# Patient Record
Sex: MALE | Race: WHITE | Employment: FULL TIME | URBAN - METROPOLITAN AREA
[De-identification: names, ages, dates, MRNs, and addresses within clinical notes are randomized per-mention and may not be internally consistent; named-entity substitution may affect disease eponyms.]

---

## 2017-09-10 ENCOUNTER — APPOINTMENT (OUTPATIENT)
Dept: GENERAL RADIOLOGY | Age: 37
End: 2017-09-10
Payer: COMMERCIAL

## 2017-09-10 ENCOUNTER — HOSPITAL ENCOUNTER (EMERGENCY)
Age: 37
Discharge: HOME | End: 2017-09-10
Payer: COMMERCIAL

## 2017-09-10 VITALS
DIASTOLIC BLOOD PRESSURE: 76 MMHG | SYSTOLIC BLOOD PRESSURE: 120 MMHG | HEART RATE: 76 BPM | HEIGHT: 73 IN | RESPIRATION RATE: 16 BRPM | TEMPERATURE: 97.9 F | BODY MASS INDEX: 24.65 KG/M2 | WEIGHT: 186 LBS | OXYGEN SATURATION: 97 %

## 2017-09-10 DIAGNOSIS — S46.911A SHOULDER STRAIN, RIGHT, INITIAL ENCOUNTER: Primary | ICD-10-CM

## 2017-09-10 PROCEDURE — 99283 EMERGENCY DEPT VISIT LOW MDM: CPT

## 2017-09-10 PROCEDURE — 73030 X-RAY EXAM OF SHOULDER: CPT

## 2017-09-10 ASSESSMENT — ENCOUNTER SYMPTOMS
ABDOMINAL PAIN: 0
SHORTNESS OF BREATH: 0
BACK PAIN: 0
COUGH: 0

## 2017-09-10 ASSESSMENT — PAIN DESCRIPTION - PAIN TYPE: TYPE: ACUTE PAIN;CHRONIC PAIN

## 2017-09-10 ASSESSMENT — PAIN DESCRIPTION - DESCRIPTORS: DESCRIPTORS: BURNING;PRESSURE

## 2017-09-10 ASSESSMENT — PAIN DESCRIPTION - ORIENTATION: ORIENTATION: RIGHT

## 2017-09-10 ASSESSMENT — PAIN DESCRIPTION - LOCATION: LOCATION: SHOULDER

## 2017-09-10 ASSESSMENT — PAIN DESCRIPTION - FREQUENCY: FREQUENCY: CONTINUOUS

## 2017-09-10 ASSESSMENT — PAIN SCALES - GENERAL: PAINLEVEL_OUTOF10: 7

## 2017-09-10 ASSESSMENT — PAIN DESCRIPTION - ONSET: ONSET: ON-GOING

## 2018-07-16 ENCOUNTER — HOSPITAL ENCOUNTER (EMERGENCY)
Age: 38
Discharge: HOME | End: 2018-07-16

## 2018-07-16 VITALS
BODY MASS INDEX: 29.16 KG/M2 | RESPIRATION RATE: 16 BRPM | TEMPERATURE: 97.8 F | HEIGHT: 73 IN | HEART RATE: 56 BPM | SYSTOLIC BLOOD PRESSURE: 122 MMHG | OXYGEN SATURATION: 97 % | WEIGHT: 220 LBS | DIASTOLIC BLOOD PRESSURE: 86 MMHG

## 2018-07-16 DIAGNOSIS — Z76.0 ENCOUNTER FOR MEDICATION REFILL: Primary | ICD-10-CM

## 2018-07-16 PROCEDURE — 99281 EMR DPT VST MAYX REQ PHY/QHP: CPT

## 2018-07-16 RX ORDER — PHENYTOIN SODIUM 100 MG/1
100 CAPSULE, EXTENDED RELEASE ORAL 4 TIMES DAILY
Qty: 120 CAPSULE | Refills: 0 | Status: ON HOLD | OUTPATIENT
Start: 2018-07-16 | End: 2019-05-09 | Stop reason: HOSPADM

## 2018-07-16 RX ORDER — PHENYTOIN SODIUM 100 MG/1
CAPSULE, EXTENDED RELEASE ORAL
Status: ON HOLD | COMMUNITY
End: 2019-05-09 | Stop reason: HOSPADM

## 2018-07-16 ASSESSMENT — ENCOUNTER SYMPTOMS
DIARRHEA: 0
ABDOMINAL PAIN: 0
NAUSEA: 0
COUGH: 0
VOMITING: 0
SHORTNESS OF BREATH: 0

## 2018-07-16 NOTE — ED PROVIDER NOTES
education: N/A     Social History Main Topics    Smoking status: Current Every Day Smoker     Packs/day: 0.50     Types: Cigarettes    Smokeless tobacco: Never Used    Alcohol use No    Drug use: No    Sexual activity: Yes     Partners: Female     Other Topics Concern    None     Social History Narrative    None       SCREENINGS             PHYSICAL EXAM    (up to 7 for level 4, 8 or more for level 5)     ED Triage Vitals [07/16/18 1744]   BP Temp Temp Source Pulse Resp SpO2 Height Weight   122/86 97.8 °F (36.6 °C) Oral 56 16 97 % 6' 1\" (1.854 m) 220 lb (99.8 kg)       Physical Exam   Constitutional: He is oriented to person, place, and time. He appears well-developed and well-nourished. He is active. No distress. HENT:   Head: Normocephalic and atraumatic. Right Ear: Hearing, tympanic membrane, external ear and ear canal normal.   Left Ear: Hearing, tympanic membrane, external ear and ear canal normal.   Mouth/Throat: Uvula is midline, oropharynx is clear and moist and mucous membranes are normal.   Eyes: Conjunctivae, EOM and lids are normal. Pupils are equal, round, and reactive to light. Neck: Trachea normal and normal range of motion. Neck supple. Cardiovascular: Normal rate, regular rhythm, normal heart sounds, intact distal pulses and normal pulses. Pulmonary/Chest: Effort normal and breath sounds normal.   Abdominal: Soft. Normal appearance and bowel sounds are normal. There is no tenderness. Neurological: He is alert and oriented to person, place, and time. He has normal strength. No focal neurological deficits   Skin: Skin is warm, dry and intact. No rash noted. He is not diaphoretic. Psychiatric: He has a normal mood and affect. His behavior is normal. Judgment and thought content normal.   Nursing note and vitals reviewed. All other labs were within normal range or not returned as of this dictation.     EMERGENCY DEPARTMENT COURSE and DIFFERENTIAL DIAGNOSIS/MDM:   Vitals:

## 2018-07-16 NOTE — ED TRIAGE NOTES
Pt ran out of dilantin a  Month ago, here for a med refill. Pt said he had a seizure a week ago.   Pt awake alert oriented x 3 color pink skin wam and dry

## 2019-05-08 ENCOUNTER — APPOINTMENT (OUTPATIENT)
Dept: CT IMAGING | Age: 39
DRG: 310 | End: 2019-05-08

## 2019-05-08 ENCOUNTER — HOSPITAL ENCOUNTER (INPATIENT)
Age: 39
LOS: 1 days | Discharge: HOME | DRG: 310 | End: 2019-05-09
Attending: INTERNAL MEDICINE | Admitting: INTERNAL MEDICINE

## 2019-05-08 DIAGNOSIS — R00.1 BRADYCARDIA: Primary | ICD-10-CM

## 2019-05-08 DIAGNOSIS — I95.1 ORTHOSTATIC HYPOTENSION: ICD-10-CM

## 2019-05-08 DIAGNOSIS — R42 DIZZINESS: ICD-10-CM

## 2019-05-08 LAB
ALBUMIN SERPL-MCNC: 4.9 G/DL (ref 3.5–4.6)
ALP BLD-CCNC: 52 U/L (ref 35–104)
ALT SERPL-CCNC: 34 U/L (ref 0–41)
AMPHETAMINE SCREEN, URINE: ABNORMAL
ANION GAP SERPL CALCULATED.3IONS-SCNC: 13 MEQ/L (ref 9–15)
AST SERPL-CCNC: 36 U/L (ref 0–40)
BARBITURATE SCREEN URINE: ABNORMAL
BASOPHILS ABSOLUTE: 0 K/UL (ref 0–0.2)
BASOPHILS RELATIVE PERCENT: 0.6 %
BENZODIAZEPINE SCREEN, URINE: ABNORMAL
BILIRUB SERPL-MCNC: 0.6 MG/DL (ref 0.2–0.7)
BILIRUBIN URINE: NEGATIVE
BLOOD, URINE: NEGATIVE
BUN BLDV-MCNC: 17 MG/DL (ref 6–20)
CALCIUM SERPL-MCNC: 9.5 MG/DL (ref 8.5–9.9)
CANNABINOID SCREEN URINE: POSITIVE
CARBOXYHEMOGLOBIN: 5.2 % (ref 0–4)
CHLORIDE BLD-SCNC: 99 MEQ/L (ref 95–107)
CK MB: 10.1 NG/ML (ref 0–6.7)
CLARITY: CLEAR
CO2: 27 MEQ/L (ref 20–31)
COCAINE METABOLITE SCREEN URINE: ABNORMAL
COLOR: YELLOW
CREAT SERPL-MCNC: 0.87 MG/DL (ref 0.7–1.2)
CREATINE KINASE-MB INDEX: 1.7 % (ref 0–3.5)
EOSINOPHILS ABSOLUTE: 0.1 K/UL (ref 0–0.7)
EOSINOPHILS RELATIVE PERCENT: 1.7 %
ETHANOL PERCENT: NORMAL G/DL
ETHANOL: <10 MG/DL (ref 0–0.08)
GFR AFRICAN AMERICAN: >60
GFR NON-AFRICAN AMERICAN: >60
GLOBULIN: 2.9 G/DL (ref 2.3–3.5)
GLUCOSE BLD-MCNC: 143 MG/DL (ref 70–99)
GLUCOSE URINE: NEGATIVE MG/DL
HBA1C MFR BLD: 5.6 % (ref 4.8–5.9)
HCT VFR BLD CALC: 42 % (ref 42–52)
HEMOGLOBIN: 14.5 G/DL (ref 14–18)
KETONES, URINE: ABNORMAL MG/DL
LEUKOCYTE ESTERASE, URINE: NEGATIVE
LYMPHOCYTES ABSOLUTE: 1 K/UL (ref 1–4.8)
LYMPHOCYTES RELATIVE PERCENT: 18.4 %
Lab: ABNORMAL
MCH RBC QN AUTO: 30 PG (ref 27–31.3)
MCHC RBC AUTO-ENTMCNC: 34.6 % (ref 33–37)
MCV RBC AUTO: 86.8 FL (ref 80–100)
MONOCYTES ABSOLUTE: 0.3 K/UL (ref 0.2–0.8)
MONOCYTES RELATIVE PERCENT: 5.7 %
NEUTROPHILS ABSOLUTE: 4.2 K/UL (ref 1.4–6.5)
NEUTROPHILS RELATIVE PERCENT: 73.6 %
NITRITE, URINE: NEGATIVE
OPIATE SCREEN URINE: ABNORMAL
PDW BLD-RTO: 13.6 % (ref 11.5–14.5)
PH UA: 8.5 (ref 5–9)
PHENCYCLIDINE SCREEN URINE: ABNORMAL
PLATELET # BLD: 260 K/UL (ref 130–400)
POTASSIUM SERPL-SCNC: 5.1 MEQ/L (ref 3.4–4.9)
PRO-BNP: 62 PG/ML
PROTEIN UA: ABNORMAL MG/DL
RBC # BLD: 4.83 M/UL (ref 4.7–6.1)
SODIUM BLD-SCNC: 139 MEQ/L (ref 135–144)
SPECIFIC GRAVITY UA: 1.02 (ref 1–1.03)
TOTAL CK: 603 U/L (ref 0–190)
TOTAL PROTEIN: 7.8 G/DL (ref 6.3–8)
TROPONIN: <0.01 NG/ML (ref 0–0.01)
TSH SERPL DL<=0.05 MIU/L-ACNC: 0.61 UIU/ML (ref 0.44–3.86)
URINE REFLEX TO CULTURE: ABNORMAL
UROBILINOGEN, URINE: 1 E.U./DL
WBC # BLD: 5.7 K/UL (ref 4.8–10.8)

## 2019-05-08 PROCEDURE — 2580000003 HC RX 258: Performed by: NURSE PRACTITIONER

## 2019-05-08 PROCEDURE — 83880 ASSAY OF NATRIURETIC PEPTIDE: CPT

## 2019-05-08 PROCEDURE — 82375 ASSAY CARBOXYHB QUANT: CPT

## 2019-05-08 PROCEDURE — 36415 COLL VENOUS BLD VENIPUNCTURE: CPT

## 2019-05-08 PROCEDURE — G0480 DRUG TEST DEF 1-7 CLASSES: HCPCS

## 2019-05-08 PROCEDURE — 93005 ELECTROCARDIOGRAM TRACING: CPT

## 2019-05-08 PROCEDURE — 6370000000 HC RX 637 (ALT 250 FOR IP): Performed by: PHYSICIAN ASSISTANT

## 2019-05-08 PROCEDURE — 85025 COMPLETE CBC W/AUTO DIFF WBC: CPT

## 2019-05-08 PROCEDURE — 99222 1ST HOSP IP/OBS MODERATE 55: CPT | Performed by: INTERNAL MEDICINE

## 2019-05-08 PROCEDURE — 2060000000 HC ICU INTERMEDIATE R&B

## 2019-05-08 PROCEDURE — 82553 CREATINE MB FRACTION: CPT

## 2019-05-08 PROCEDURE — 80307 DRUG TEST PRSMV CHEM ANLYZR: CPT

## 2019-05-08 PROCEDURE — 84443 ASSAY THYROID STIM HORMONE: CPT

## 2019-05-08 PROCEDURE — 80053 COMPREHEN METABOLIC PANEL: CPT

## 2019-05-08 PROCEDURE — 81003 URINALYSIS AUTO W/O SCOPE: CPT

## 2019-05-08 PROCEDURE — 84484 ASSAY OF TROPONIN QUANT: CPT

## 2019-05-08 PROCEDURE — 99285 EMERGENCY DEPT VISIT HI MDM: CPT

## 2019-05-08 PROCEDURE — 2580000003 HC RX 258: Performed by: PHYSICIAN ASSISTANT

## 2019-05-08 PROCEDURE — 83036 HEMOGLOBIN GLYCOSYLATED A1C: CPT

## 2019-05-08 PROCEDURE — 70450 CT HEAD/BRAIN W/O DYE: CPT

## 2019-05-08 PROCEDURE — 82550 ASSAY OF CK (CPK): CPT

## 2019-05-08 RX ORDER — ASPIRIN 81 MG/1
81 TABLET, CHEWABLE ORAL DAILY
Status: DISCONTINUED | OUTPATIENT
Start: 2019-05-09 | End: 2019-05-09 | Stop reason: HOSPADM

## 2019-05-08 RX ORDER — 0.9 % SODIUM CHLORIDE 0.9 %
1000 INTRAVENOUS SOLUTION INTRAVENOUS ONCE
Status: COMPLETED | OUTPATIENT
Start: 2019-05-08 | End: 2019-05-08

## 2019-05-08 RX ORDER — MECLIZINE HYDROCHLORIDE 25 MG/1
25 TABLET ORAL ONCE
Status: DISCONTINUED | OUTPATIENT
Start: 2019-05-08 | End: 2019-05-09 | Stop reason: HOSPADM

## 2019-05-08 RX ORDER — SODIUM CHLORIDE 0.9 % (FLUSH) 0.9 %
10 SYRINGE (ML) INJECTION EVERY 12 HOURS SCHEDULED
Status: DISCONTINUED | OUTPATIENT
Start: 2019-05-08 | End: 2019-05-09 | Stop reason: HOSPADM

## 2019-05-08 RX ORDER — 0.9 % SODIUM CHLORIDE 0.9 %
2000 INTRAVENOUS SOLUTION INTRAVENOUS ONCE
Status: COMPLETED | OUTPATIENT
Start: 2019-05-08 | End: 2019-05-08

## 2019-05-08 RX ORDER — ATORVASTATIN CALCIUM 20 MG/1
20 TABLET, FILM COATED ORAL NIGHTLY
Status: DISCONTINUED | OUTPATIENT
Start: 2019-05-08 | End: 2019-05-09 | Stop reason: HOSPADM

## 2019-05-08 RX ORDER — ONDANSETRON 2 MG/ML
4 INJECTION INTRAMUSCULAR; INTRAVENOUS EVERY 6 HOURS PRN
Status: DISCONTINUED | OUTPATIENT
Start: 2019-05-08 | End: 2019-05-09 | Stop reason: HOSPADM

## 2019-05-08 RX ORDER — NITROGLYCERIN 0.4 MG/1
0.4 TABLET SUBLINGUAL EVERY 5 MIN PRN
Status: DISCONTINUED | OUTPATIENT
Start: 2019-05-08 | End: 2019-05-09 | Stop reason: HOSPADM

## 2019-05-08 RX ORDER — SODIUM CHLORIDE 0.9 % (FLUSH) 0.9 %
10 SYRINGE (ML) INJECTION PRN
Status: DISCONTINUED | OUTPATIENT
Start: 2019-05-08 | End: 2019-05-09 | Stop reason: HOSPADM

## 2019-05-08 RX ORDER — ATROPINE SULFATE 0.1 MG/ML
0.5 INJECTION INTRAVENOUS ONCE
Status: DISCONTINUED | OUTPATIENT
Start: 2019-05-08 | End: 2019-05-09 | Stop reason: HOSPADM

## 2019-05-08 RX ADMIN — SODIUM CHLORIDE 2000 ML: 9 INJECTION, SOLUTION INTRAVENOUS at 11:07

## 2019-05-08 RX ADMIN — SODIUM CHLORIDE 1000 ML: 9 INJECTION, SOLUTION INTRAVENOUS at 14:54

## 2019-05-08 ASSESSMENT — ENCOUNTER SYMPTOMS
COLOR CHANGE: 0
CONSTIPATION: 0
ABDOMINAL PAIN: 0
EYE DISCHARGE: 0
NAUSEA: 0
RHINORRHEA: 0
STRIDOR: 0
GASTROINTESTINAL NEGATIVE: 1
SHORTNESS OF BREATH: 0
EYES NEGATIVE: 1
WHEEZING: 0
ABDOMINAL DISTENTION: 0
SORE THROAT: 0
VOMITING: 0

## 2019-05-08 ASSESSMENT — PAIN SCALES - GENERAL: PAINLEVEL_OUTOF10: 0

## 2019-05-08 NOTE — ED NOTES
Nurse called lab advised by lab they have all the blood for add on orders, no need to drawn more.       Nella George RN  05/08/19 [id number]

## 2019-05-08 NOTE — ED NOTES
Nurse updated PA on bradycardia. Patient is alert and talkative no s/s at this time.       Pranay Flowers RN  05/08/19 0011

## 2019-05-08 NOTE — ED TRIAGE NOTES
Pt c/o dizziness since this AM,. Pt states he has not taken his seizure medications in \"a while\" Pt is A&OX3, calm, ambulatory, afebrile, breathes are equal and unlabored.

## 2019-05-08 NOTE — H&P
History and Physical  Patient: Calixto Rubalcava  Unit/Bed:15/15  YOB: 1980  MRN: [id number]  Acct: [de-identified]   Admitting Diagnosis: Bradycardia [R00.1]  Admit Date:  5/8/2019  Hospital Day: 0      Chief Complaint:   dizziness    History of Present Illness:   46y male came into the emergency department complaining of fatigue and dizziness. Patient heart rate 40 while laying in bed. With ambulation heart rate increases to above 70. No chest pain, fever, chills, nausea, vomiting, diarrhea, PND, orthopnea, claudications. Patient states smoke marijuana every day  Positive for tobacco abuse  Hx of seizures stop taking dilantin 1 year ago. No Known Allergies    Current Facility-Administered Medications   Medication Dose Route Frequency Provider Last Rate Last Dose    meclizine (ANTIVERT) tablet 25 mg  25 mg Oral Once Caridad Meng Gonzalez PA-C        atropine injection 0.5 mg  0.5 mg Intravenous Once Caridad Carrier Celso Muniz PA-C   Stopped at 05/08/19 1219     Current Outpatient Medications   Medication Sig Dispense Refill    phenytoin (DILANTIN) 100 MG ER capsule Take by mouth      phenytoin (DILANTIN) 100 MG ER capsule Take 1 capsule by mouth 4 times daily 120 capsule 0       PMHx:  Past Medical History:   Diagnosis Date    Arrhythmia     Seizure [facility])        PSHx:  History reviewed. No pertinent surgical history.     Social Hx:     Social History     Socioeconomic History    Marital status:      Spouse name: None    Number of children: None    Years of education: None    Highest education level: None   Occupational History    None   Social Needs    Financial resource strain: None    Food insecurity:     Worry: None     Inability: None    Transportation needs:     Medical: None     Non-medical: None   Tobacco Use    Smoking status: Current Every Day Smoker     Packs/day: 0.50     Types: Cigarettes    Smokeless tobacco: Never Used   Substance and Sexual Activity    Alcohol use: No    Drug use: No    Sexual activity: Yes     Partners: Female   Lifestyle    Physical activity:     Days per week: None     Minutes per session: None    Stress: None   Relationships    Social connections:     Talks on phone: None     Gets together: None     Attends Rastafarian service: None     Active member of club or organization: None     Attends meetings of clubs or organizations: None     Relationship status: None    Intimate partner violence:     Fear of current or ex partner: None     Emotionally abused: None     Physically abused: None     Forced sexual activity: None   Other Topics Concern    None   Social History Narrative    None       Family Hx:  History reviewed. No pertinent family history. Review ofSystems:   Review of Systems   Constitutional: Negative. Negative for chills and fever. Eyes: Negative. Respiratory: Negative for shortness of breath, wheezing and stridor. Cardiovascular: Negative for chest pain, palpitations and leg swelling. Gastrointestinal: Negative. Negative for abdominal pain, nausea and vomiting. Skin: Negative. Negative for rash. Neurological: Positive for dizziness and light-headedness. Negative for weakness and headaches. Physical Examination:    /76   Pulse (!) 38   Temp 97.3 °F (36.3 °C) (Oral)   Resp 16   Ht 6' (1.829 m)   Wt 170 lb (77.1 kg)   SpO2 98%   BMI 23.06 kg/m²    Physical Exam   Constitutional: He is oriented to person, place, and time. He appears well-developed and well-nourished. Non-toxic appearance. He does not appear ill. No distress. HENT:   Head: Normocephalic and atraumatic. Eyes: Pupils are equal, round, and reactive to light. Conjunctivae and EOM are normal.   Neck: Trachea normal and normal range of motion. Normal carotid pulses, no hepatojugular reflux and no JVD present. Carotid bruit is not present. No thyromegaly present.    Cardiovascular: Normal rate, regular rhythm, S1 normal, S2 normal, normal heart sounds and intact distal pulses. PMI is not displaced. Exam reveals no gallop, no S3, no S4, no distant heart sounds and no friction rub. No murmur heard. Pulses:       Carotid pulses are 3+ on the right side, and 3+ on the left side. Radial pulses are 3+ on the right side, and 3+ on the left side. Femoral pulses are 3+ on the right side, and 3+ on the left side. Popliteal pulses are 3+ on the right side, and 3+ on the left side. Dorsalis pedis pulses are 3+ on the right side, and 3+ on the left side. Posterior tibial pulses are 3+ on the right side, and 3+ on the left side. Pulmonary/Chest: Effort normal and breath sounds normal. No stridor. No tachypnea. No respiratory distress. He has no wheezes. He has no rales. He exhibits no tenderness. Abdominal: Soft. Bowel sounds are normal. He exhibits no distension, no ascites and no pulsatile midline mass. There is no tenderness. There is no rigidity, no rebound and no guarding. Musculoskeletal: Normal range of motion. He exhibits no edema or tenderness. Neurological: He is alert and oriented to person, place, and time. No cranial nerve deficit. Skin: Skin is warm. He is not diaphoretic. No erythema. Psychiatric: He has a normal mood and affect.  His speech is normal and behavior is normal. Judgment and thought content normal.        LABS:  CBC:   Lab Results   Component Value Date    WBC 5.7 05/08/2019    RBC 4.83 05/08/2019    HGB 14.5 05/08/2019    HCT 42.0 05/08/2019    MCV 86.8 05/08/2019    MCH 30.0 05/08/2019    MCHC 34.6 05/08/2019    RDW 13.6 05/08/2019     05/08/2019    MPV 7.7 09/17/2015     CBC with Differential:   Lab Results   Component Value Date    WBC 5.7 05/08/2019    RBC 4.83 05/08/2019    HGB 14.5 05/08/2019    HCT 42.0 05/08/2019     05/08/2019    MCV 86.8 05/08/2019    MCH 30.0 05/08/2019    MCHC 34.6 05/08/2019    RDW 13.6 05/08/2019    LYMPHOPCT 18.4 05/08/2019    MONOPCT 5.7 05/08/2019

## 2019-05-08 NOTE — ED PROVIDER NOTES
[facility] ED  eMERGENCY dEPARTMENT eNCOUnter      Pt Name: Chadwick Wahl  MRN: [id number]  [place] 1980  Date of evaluation: 5/8/2019  Provider: Tamiko Fuchs PA-C    CHIEF COMPLAINT       Chief Complaint   Patient presents with    Dizziness     pt c/o dizziness since this AM         HISTORY OF PRESENT ILLNESS   (Location/Symptom, Timing/Onset,Context/Setting, Quality, Duration, Modifying Factors, Severity)  Note limiting factors. Chadwick Wahl is a 41 y.o. male who presents to the emergency department with a complaint of dizziness which patient states started earlier this morning. Patient states whenever he stands up he gets extremely dizzy he feels as if the room is spinning, he states that since occurs he gets nauseated as well there is no chest pain no shortness of breath. He denies any numbness or tingling no visual changes. He states last evening he was applying pain to the roof of the trailer and states he got on his clothing he states when he got home he was using a  to take the paint off of his pants he does not know whether he inhaled some of the fumes causing his dizziness this morning. denies any past history of vertigo. He states while he is sitting at rest there is no dizziness if he moves his head or tries to stand up the dizziness occurs. He denies any pain at this time 0 out of 10      Patient states he does have past history of seizure disorder he states he was on Dilantin in the past but took himself off the medication approximately one year ago he says he has been seizure-free since that time. HPI    NursingNotes were reviewed. REVIEW OF SYSTEMS    (2-9 systems for level 4, 10 or more for level 5)     Review of Systems   Constitutional: Negative for activity change and appetite change. HENT: Negative for congestion, ear discharge, ear pain, nosebleeds, rhinorrhea and sore throat. Eyes: Negative for discharge.    Respiratory: Negative for shortness of breath. Cardiovascular: Negative for chest pain, palpitations and leg swelling. Gastrointestinal: Negative for abdominal distention, abdominal pain and constipation. Genitourinary: Negative for difficulty urinating and dysuria. Musculoskeletal: Negative for arthralgias. Skin: Negative for color change, pallor and wound. Neurological: Positive for dizziness. Negative for tremors, syncope, weakness, numbness and headaches. Psychiatric/Behavioral: Negative for agitation and confusion. Except as noted above the remainder of the review of systems was reviewed and negative. PAST MEDICAL HISTORY     Past Medical History:   Diagnosis Date    Arrhythmia     Seizure ([facility] [address]          SURGICALHISTORY     History reviewed. No pertinent surgical history. CURRENT MEDICATIONS       Previous Medications    PHENYTOIN (DILANTIN) 100 MG ER CAPSULE    Take by mouth    PHENYTOIN (DILANTIN) 100 MG ER CAPSULE    Take 1 capsule by mouth 4 times daily       ALLERGIES     Patient has no known allergies. FAMILY HISTORY     History reviewed. No pertinent family history.        SOCIAL HISTORY       Social History     Socioeconomic History    Marital status:      Spouse name: None    Number of children: None    Years of education: None    Highest education level: None   Occupational History    None   Social Needs    Financial resource strain: None    Food insecurity:     Worry: None     Inability: None    Transportation needs:     Medical: None     Non-medical: None   Tobacco Use    Smoking status: Current Every Day Smoker     Packs/day: 0.50     Types: Cigarettes    Smokeless tobacco: Never Used   Substance and Sexual Activity    Alcohol use: No    Drug use: No    Sexual activity: Yes     Partners: Female   Lifestyle    Physical activity:     Days per week: None     Minutes per session: None    Stress: None   Relationships    Social connections:     Talks on phone: None Gets together: None     Attends Restorationism service: None     Active member of club or organization: None     Attends meetings of clubs or organizations: None     Relationship status: None    Intimate partner violence:     Fear of current or ex partner: None     Emotionally abused: None     Physically abused: None     Forced sexual activity: None   Other Topics Concern    None   Social History Narrative    None       SCREENINGS   NIH Stroke Scale  Interval: Baseline  Level of Consciousness (1a. ): Alert  LOC Questions (1b. ): Answers both correctly  LOC Commands (1c. ): Obeys both correctly  Best Gaze (2. ): Normal  Visual (3. ): No visual loss  Facial Palsy (4. ): Normal  Motor Arm, Left (5a. ): No drift  Motor Arm, Right (5b. ): No drift  Motor Leg, Left (6a. ): No drift  Motor Leg, Right (6b. ): No drift  Limb Ataxia (7. ): Absent  Sensory (8. ): Normal  Best Language (9. ): No aphasia  Dysarthria (10. ): Normal  Extinction and Inattention (11): No neglect  Total: 0Glasgow Coma Scale  Eye Opening: Spontaneous  Best Verbal Response: Oriented  Best Motor Response: Obeys commands  T[place] Coma Scale Score: 15 @FLOW([id number])@      PHYSICAL EXAM    (up to 7 for level 4, 8 or more for level 5)     ED Triage Vitals [05/08/19 1005]   BP Temp Temp Source Pulse Resp SpO2 Height Weight   127/75 97.3 °F (36.3 °C) Oral 82 16 98 % 6' (1.829 m) 170 lb (77.1 kg)       Physical Exam   Constitutional: He is oriented to person, place, and time. He appears well-developed and well-nourished. HENT:   Head: Normocephalic. Patient states increased dizziness with rotation or movement of the head or with standing position. Eyes: Pupils are equal, round, and reactive to light. EOM are normal.   Neck: Normal range of motion. Neck supple. No JVD present. No tracheal deviation present. Cardiovascular: Normal rate. Pulmonary/Chest: Effort normal and breath sounds normal. No respiratory distress. He has no wheezes. He has no rales. He exhibits no tenderness. Abdominal: Soft. Bowel sounds are normal. He exhibits no distension and no mass. There is no tenderness. There is no guarding. Musculoskeletal: Normal range of motion. He exhibits no edema or deformity. Neurological: He is alert and oriented to person, place, and time. Coordination normal.   Skin: Skin is warm. DIAGNOSTIC RESULTS     EKG: All EKG's are interpreted by the Emergency Department Physician who either signs or Co-signsthis chart in the absence of a cardiologist.    EKG shows sinus bradycardia at 43 bpm there is no acute ST segment abnormality there is no ventricular ectopy QT is 448 ms OR interval is 144 ms. When compared to previous EKG of December 10, 2016 no acute change is found. EKG shows sinus bradycardia at 39 bpm there is no acute ST segment abnormality acute ventricular ectopy. OR interval is 146 ms QTC is 470 ms. RADIOLOGY:   Non-plain filmimages such as CT, Ultrasound and MRI are read by the radiologist. Plain radiographic images are visualized and preliminarily interpreted by the emergency physician with the below findings:    CT of the brain shows no acute intracranial process    Interpretation per the Radiologist below, if available at the time ofthis note:    CT Head WO Contrast   Final Result   NO ACUTE INTRACRANIAL PATHOLOGY.                          ED BEDSIDE ULTRASOUND:   Performed by ED Physician - none    LABS:  Labs Reviewed   COMPREHENSIVE METABOLIC PANEL - Abnormal; Notable for the following components:       Result Value    Potassium 5.1 (*)     Glucose 143 (*)     Alb 4.9 (*)     All other components within normal limits   URINE DRUG SCREEN - Abnormal; Notable for the following components:    Cannabinoid Scrn, Ur POSITIVE (*)     All other components within normal limits   URINE RT REFLEX TO CULTURE - Abnormal; Notable for the following components:    Ketones, Urine TRACE (*)     Protein, UA TRACE (*)     All other components within deterioration in the patient's condition which required my urgent intervention. CONSULTS:  None    PROCEDURES:  Unless otherwise noted below, none     Procedures    FINAL IMPRESSION      1. Bradycardia    2. Dizziness    3. Orthostatic hypotension          DISPOSITION/PLAN   DISPOSITION Decision To Admit 05/08/2019 11:43:36 AM      PATIENT REFERRED TO:  No follow-up provider specified.     DISCHARGE MEDICATIONS:  New Prescriptions    No medications on file          (Please note that portions of this note were completed with a voice recognition program.  Efforts were made to edit the dictations but occasionally words are mis-transcribed.)    Sierra Sosa PA-C (electronically signed)  Attending Emergency Physician         Sierra Sosa PA-C  05/08/19 [address] Baljit Toussaint PA-C  05/08/19 [address] Baljit Toussaint PA-C  05/08/19 [address] Toussaint PA-C  05/08/19 [id number]

## 2019-05-08 NOTE — ED NOTES
at bedside advised we do not need to give iv atropine. Patient alert and talkative not symptomatic to bradycardia. Patient updated on plan of care.       Eddie Wilson RN  05/08/19 [id number]

## 2019-05-08 NOTE — ED NOTES
Patient states he feel dizzy and light headed while doing orthostatic blood pressures.       Jevon Swanson RN  05/08/19 1100

## 2019-05-08 NOTE — ED NOTES
Nurse called report to one [employer]. Nurse update patient on admission and bed assignment. Patient is resting in bed at this time no s/s of bradycardia noted.       Komal Rossi RN  05/08/19 6827

## 2019-05-09 VITALS
RESPIRATION RATE: 18 BRPM | SYSTOLIC BLOOD PRESSURE: 121 MMHG | WEIGHT: 170 LBS | TEMPERATURE: 97.7 F | BODY MASS INDEX: 23.03 KG/M2 | OXYGEN SATURATION: 100 % | HEIGHT: 72 IN | DIASTOLIC BLOOD PRESSURE: 68 MMHG | HEART RATE: 48 BPM

## 2019-05-09 LAB
CHOLESTEROL, TOTAL: 121 MG/DL (ref 0–199)
EKG ATRIAL RATE: 36 BPM
EKG ATRIAL RATE: 39 BPM
EKG ATRIAL RATE: 43 BPM
EKG P AXIS: 65 DEGREES
EKG P AXIS: 68 DEGREES
EKG P AXIS: 69 DEGREES
EKG P-R INTERVAL: 144 MS
EKG P-R INTERVAL: 146 MS
EKG P-R INTERVAL: 160 MS
EKG Q-T INTERVAL: 448 MS
EKG Q-T INTERVAL: 470 MS
EKG Q-T INTERVAL: 476 MS
EKG QRS DURATION: 102 MS
EKG QRS DURATION: 98 MS
EKG QRS DURATION: 98 MS
EKG QTC CALCULATION (BAZETT): 368 MS
EKG QTC CALCULATION (BAZETT): 378 MS
EKG QTC CALCULATION (BAZETT): 378 MS
EKG R AXIS: 90 DEGREES
EKG R AXIS: 91 DEGREES
EKG R AXIS: 94 DEGREES
EKG T AXIS: 70 DEGREES
EKG T AXIS: 71 DEGREES
EKG T AXIS: 76 DEGREES
EKG VENTRICULAR RATE: 36 BPM
EKG VENTRICULAR RATE: 39 BPM
EKG VENTRICULAR RATE: 43 BPM
HCT VFR BLD CALC: 39.3 % (ref 42–52)
HDLC SERPL-MCNC: 48 MG/DL (ref 40–59)
HEMOGLOBIN: 13.3 G/DL (ref 14–18)
LDL CHOLESTEROL CALCULATED: 64 MG/DL (ref 0–129)
LV EF: 65 %
LVEF MODALITY: NORMAL
MAGNESIUM: 1.8 MG/DL (ref 1.7–2.4)
MCH RBC QN AUTO: 30.2 PG (ref 27–31.3)
MCHC RBC AUTO-ENTMCNC: 33.7 % (ref 33–37)
MCV RBC AUTO: 89.5 FL (ref 80–100)
PDW BLD-RTO: 13.3 % (ref 11.5–14.5)
PLATELET # BLD: 240 K/UL (ref 130–400)
RBC # BLD: 4.39 M/UL (ref 4.7–6.1)
TRIGL SERPL-MCNC: 46 MG/DL (ref 0–150)
WBC # BLD: 5.4 K/UL (ref 4.8–10.8)

## 2019-05-09 PROCEDURE — 93306 TTE W/DOPPLER COMPLETE: CPT

## 2019-05-09 PROCEDURE — 99232 SBSQ HOSP IP/OBS MODERATE 35: CPT | Performed by: INTERNAL MEDICINE

## 2019-05-09 PROCEDURE — 93005 ELECTROCARDIOGRAM TRACING: CPT

## 2019-05-09 PROCEDURE — 93010 ELECTROCARDIOGRAM REPORT: CPT | Performed by: INTERNAL MEDICINE

## 2019-05-09 PROCEDURE — 80061 LIPID PANEL: CPT

## 2019-05-09 PROCEDURE — 36415 COLL VENOUS BLD VENIPUNCTURE: CPT

## 2019-05-09 PROCEDURE — 85027 COMPLETE CBC AUTOMATED: CPT

## 2019-05-09 PROCEDURE — 6370000000 HC RX 637 (ALT 250 FOR IP): Performed by: NURSE PRACTITIONER

## 2019-05-09 PROCEDURE — 83735 ASSAY OF MAGNESIUM: CPT

## 2019-05-09 RX ADMIN — ASPIRIN 81 MG 81 MG: 81 TABLET ORAL at 07:27

## 2019-05-09 ASSESSMENT — ENCOUNTER SYMPTOMS
BLOOD IN STOOL: 0
CHEST TIGHTNESS: 0
COUGH: 0
WHEEZING: 0
GASTROINTESTINAL NEGATIVE: 1
RESPIRATORY NEGATIVE: 1
EYES NEGATIVE: 1
SHORTNESS OF BREATH: 0
STRIDOR: 0
NAUSEA: 0

## 2019-05-09 ASSESSMENT — PAIN SCALES - GENERAL
PAINLEVEL_OUTOF10: 0
PAINLEVEL_OUTOF10: 0

## 2019-05-09 NOTE — CARE COORDINATION
Met with patient regarding discharge planning. He states he is home with his wife and kids. He works. He does not have insurance and was seen by [facility]. He is over income for [employer] and [employer]. He has charitable assistance application. Plan will be home at discharge.   Electronically signed by Casper Jay RN on 5/9/19 at 11:51 AM

## 2019-05-09 NOTE — PROGRESS NOTES
Nutrition Assessment    Type and Reason for Visit: Initial, Positive Nutrition Screen, Consult(unintneded wt loss)    Nutrition Recommendations: Continue  Current plan of care    Nutrition Assessment: Unable to determine nutritional status at this time, pt refused interview,stating he ' has calls to make', currently tolerating po > 75%    Malnutrition Assessment:  · Malnutrition Status: Insufficient data  · Context: Acute illness or injury  · Findings of the 6 clinical characteristics of malnutrition (Minimum of 2 out of 6 clinical characteristics is required to make the diagnosis of moderate or severe Protein Calorie Malnutrition based on AND/ASPEN Guidelines):  1. Energy Intake-Unable to assess, Unable to assess    2. Weight Loss- ,    3. Fat Loss-Unable to assess,    4. Muscle Loss-Unable to assess,    5. Fluid Accumulation-No significant fluid accumulation,    6.  Strength-Not measured    Nutrition Risk Level:  Moderate    Nutrient Needs:  · Estimated Daily Total Kcal: 2300 kcals ( 30 kcals/kg)  · Estimated Daily Protein (g): 78 ( 1.o g/kg)  · Estimated Daily Total Fluid (ml/day):      Nutrition Diagnosis:   · Problem: Unintended weight loss  · Etiology: related to Other (Comment)(unknown etiology)     Signs and symptoms:  as evidenced by Patient report of    Objective Information:  · Nutrition-Focused Physical Findings: no significant findings, poor dentition noted on admission screen, hx of SZ disorder, admitted for hypotension/bradycardia  · Wound Type: None  · Current Nutrition Therapies:  · Oral Diet Orders: Cardiac   · Oral Diet intake: %  · Oral Nutrition Supplement (ONS) Orders: None  · Anthropometric Measures:  · Ht: 6' (182.9 cm)   · Current Body Wt: (up in chair, UTD)  · Admission Body Wt: 170 lb (77.1 kg)(stated)  · Usual Body Wt: 220 lb (99.8 kg)(stated 2018, 202@ stated 2017)  · % Weight Change:  ,  UTD  · Ideal Body Wt: 178 lb (80.7 kg), % Ideal Body UTD  · BMI Classification: BMI 18.5 - 24.9 Normal Weight(est)    Nutrition Interventions:   Continue current diet  Continued Inpatient Monitoring, Education Not Indicated    Nutrition Evaluation:   · Evaluation: Goals set   · Goals: po > 75%    · Monitoring: Meal Intake, Weight      Electronically signed by Lio Longoria RD, LD on 5/9/19 at 2:02 PM

## 2019-05-09 NOTE — PROGRESS NOTES
Negative. Negative for cough, chest tightness, shortness of breath, wheezing and stridor. Cardiovascular: Negative. Negative for chest pain, palpitations and leg swelling. Gastrointestinal: Negative. Negative for blood in stool and nausea. Genitourinary: Negative. Musculoskeletal: Negative. Skin: Negative. Neurological: Positive for light-headedness. Negative for dizziness, syncope and weakness. Hematological: Negative. Psychiatric/Behavioral: Negative. Physical Examination:    /66   Pulse (!) 44   Temp 98.1 °F (36.7 °C) (Oral)   Resp 18   Ht 6' (1.829 m)   Wt 170 lb (77.1 kg)   SpO2 99%   BMI 23.06 kg/m²    Physical Exam   Constitutional: He appears healthy. No distress. HENT:   Normal cephalic and Atraumatic   Eyes: Pupils are equal, round, and reactive to light. Neck: Normal range of motion and thyroid normal. Neck supple. No JVD present. No neck adenopathy. No thyromegaly present. Cardiovascular: Normal rate, regular rhythm, normal heart sounds, intact distal pulses and normal pulses. Pulmonary/Chest: Effort normal and breath sounds normal. He has no wheezes. He has no rales. He exhibits no tenderness. Abdominal: Soft. Bowel sounds are normal. There is no tenderness. Musculoskeletal: Normal range of motion. He exhibits no edema or tenderness. Neurological: He is alert and oriented to person, place, and time. Skin: Skin is warm. No cyanosis. Nails show no clubbing.        LABS:  CBC:   Lab Results   Component Value Date    WBC 5.4 05/09/2019    RBC 4.39 05/09/2019    HGB 13.3 05/09/2019    HCT 39.3 05/09/2019    MCV 89.5 05/09/2019    MCH 30.2 05/09/2019    MCHC 33.7 05/09/2019    RDW 13.3 05/09/2019     05/09/2019    MPV 7.7 09/17/2015     CBC with Differential:    Lab Results   Component Value Date    WBC 5.4 05/09/2019    RBC 4.39 05/09/2019    HGB 13.3 05/09/2019    HCT 39.3 05/09/2019     05/09/2019    MCV 89.5 05/09/2019    MCH 30.2 05/09/2019    MCHC 33.7 05/09/2019    RDW 13.3 05/09/2019    LYMPHOPCT 18.4 05/08/2019    MONOPCT 5.7 05/08/2019    BASOPCT 0.6 05/08/2019    MONOSABS 0.3 05/08/2019    LYMPHSABS 1.0 05/08/2019    EOSABS 0.1 05/08/2019    BASOSABS 0.0 05/08/2019     CMP:    Lab Results   Component Value Date     05/08/2019    K 5.1 05/08/2019    CL 99 05/08/2019    CO2 27 05/08/2019    BUN 17 05/08/2019    CREATININE 0.87 05/08/2019    GFRAA >60.0 05/08/2019    LABGLOM >60.0 05/08/2019    GLUCOSE 143 05/08/2019    PROT 7.8 05/08/2019    LABALBU 4.9 05/08/2019    CALCIUM 9.5 05/08/2019    BILITOT 0.6 05/08/2019    ALKPHOS 52 05/08/2019    AST 36 05/08/2019    ALT 34 05/08/2019     BMP:    Lab Results   Component Value Date     05/08/2019    K 5.1 05/08/2019    CL 99 05/08/2019    CO2 27 05/08/2019    BUN 17 05/08/2019    LABALBU 4.9 05/08/2019    CREATININE 0.87 05/08/2019    CALCIUM 9.5 05/08/2019    GFRAA >60.0 05/08/2019    LABGLOM >60.0 05/08/2019    GLUCOSE 143 05/08/2019     Magnesium:    Lab Results   Component Value Date    MG 1.8 05/09/2019     Troponin:    Lab Results   Component Value Date    TROPONINI <0.010 05/08/2019        Active Hospital Problems    Diagnosis Date Noted    Bradycardia [R00.1] 05/08/2019     Priority: Low        Assessment/Plan:  1. Near syncope has Bradycardia but chronotropically intact. Long h/o SZ disorder but stopped taking meds few years ago. 2. Keep On Telemetry today. 3. Neuro to evaluate  4.  Echo -P       Electronically signed by Ernesto Mann MD on 5/9/2019 at 7:26 AM

## 2019-05-09 NOTE — FLOWSHEET NOTE
Went into patient room to talk with and patient just really upset \"heart rate in the 30's and no Dr. Sanchez Hew today\". Clarified his heart monitor has been on and his range of heart rate hs been 50-55's for shift\" so far. No occurrences of in the 30's for me this shift. Patient just wondering why no heart Dr. Lazara Robbins be seeing again today. Reviewed 's  Recommendations and orders/ and waiting for Dr. Christine Caraballo. Ambulated with patient in sanchez to monitor room to show him where he is being monitored. Zaria Marshall NP  In monitor room and spoke in depth to patient regarding heart rate and plan of care. Patient seemed very insistent on going AMA and wants to go to [facility]. Zaria Marshall NP aware and reviewed tests with patient. Orders received. Plan to discharge as ordered.

## 2019-05-09 NOTE — DISCHARGE INSTR - COC
Admin  {[facility] DME PRLW:757778078}  Med Delivery   { PHILLIP MED Delivery:399713130}    Wound Care Documentation and Therapy:        Elimination:  Continence:   · Bowel: {YES / IJ:77333}  · Bladder: {YES / XA:20625}  Urinary Catheter: {Urinary Catheter:698251037}   Colostomy/Ileostomy/Ileal Conduit: {YES / BM:04797}       Date of Last BM: ***    Intake/Output Summary (Last 24 hours) at 2019 1435  Last data filed at 2019 1150  Gross per 24 hour   Intake 5260 ml   Output 1400 ml   Net 3860 ml     I/O last 3 completed shifts: In: 4200 [P.O.:1200;  I.V.:3000]  Out: 400 [Urine:400]    Safety Concerns:     [employer] Safety Concerns:524768083}    Impairments/Disabilities:      [address] [employer] Impairments/Disabilities:487015123}    Nutrition Therapy:  Current Nutrition Therapy:   [employer] Diet List:207734243}    Routes of Feeding: {[facility] DME Other Feedings:269758811}  Liquids: {Slp liquid thickness:88073}  Daily Fluid Restriction: {[facility] DME Yes amt example:131423690}  Last Modified Barium Swallow with Video (Video Swallowing Test): {Done Not Done UMAY:643448141}    Treatments at the Time of Hospital Discharge:   Respiratory Treatments: ***  Oxygen Therapy:  {Therapy; copd oxygen:41928}  Ventilator:    { CC Vent USNU:501727350}    Rehab Therapies: {THERAPEUTIC INTERVENTION:0888285946}  Weight Bearing Status/Restrictions: [employer] Weight Bearin}  Other Medical Equipment (for information only, NOT a DME order):  {EQUIPMENT:869514389}  Other Treatments: ***    Patient's personal belongings (please select all that are sent with patient):  {[facility] DME Belongings:581121484}    RN SIGNATURE:  {Esignature:513493877}    CASE MANAGEMENT/SOCIAL WORK SECTION    Inpatient Status Date: ***    Readmission Risk Assessment Score:  Readmission Risk              Risk of Unplanned Readmission:        8           Discharging to Facility/ Agency   · Name:   · Address:  · Phone:  · Fax:    Dialysis Facility (if applicable)

## 2019-05-09 NOTE — FLOWSHEET NOTE
Spoke with wife on phone/ gave HIPPA code after patient okay'd her to have information. Wife very concerned with low heart rate and that no Dr. Francisco Mukesh yet today. Informed Dr. Sury Carter was here around 730 this morning and orders were received at that time. Updated on his procedures done today as well as labs. Aware heart rate regular and  ranging in the 50-55's today, but that it occasionally drops down to high 40's occassionally. Patient is asymptomatice with low HR in the 50's. Up in room, no further episodes of syncope. Gaite steady. aiting for Dr. Debra Amato to round, new consult. Will note changes.

## 2019-05-09 NOTE — PROGRESS NOTES
Patient refusing to stay and neurology at this time. Insists on leaving now or will sign against medical advice.

## 2019-05-09 NOTE — PROGRESS NOTES
Osmani Evans NP wrote discharge orders for patient with f/u scheduled. Reviewed discharge with patient and  Aware of follow up necessary with NP Phuong Harmon and Dr. Harjinder Caputo follow up in 2 weeks. Patient To call to make both appointments. Patient presently denies any questions or concerns. Patient still not wanting to wait for Dr. Rody Falk consulted yesterday. No syncope all day and denies any light headedness or dizziness presently. Stable. No acute distress. Patient was up in sanchez during the day. Plan discharge as ordered.

## 2019-05-10 ENCOUNTER — TELEPHONE (OUTPATIENT)
Dept: FAMILY MEDICINE CLINIC | Age: 39
End: 2019-05-10

## 2019-05-13 ENCOUNTER — TELEPHONE (OUTPATIENT)
Dept: FAMILY MEDICINE CLINIC | Age: 39
End: 2019-05-13

## 2019-05-13 NOTE — TELEPHONE ENCOUNTER
[facility] Trans[facility] Initial Follow Up Call    Outreach made within 2 business days of discharge: Yes    Patient: Calixto Rubalcava Patient : 1980   MRN: [id number]  Reason for Admission:   Discharge Date: 19       Spoke with: [facility]

## 2019-09-19 ENCOUNTER — HOSPITAL ENCOUNTER (EMERGENCY)
Age: 39
Discharge: HOME | End: 2019-09-19

## 2019-09-19 VITALS
BODY MASS INDEX: 23.03 KG/M2 | HEART RATE: 69 BPM | SYSTOLIC BLOOD PRESSURE: 122 MMHG | OXYGEN SATURATION: 100 % | HEIGHT: 72 IN | RESPIRATION RATE: 20 BRPM | TEMPERATURE: 98 F | WEIGHT: 170 LBS | DIASTOLIC BLOOD PRESSURE: 76 MMHG

## 2019-09-19 DIAGNOSIS — R05.9 COUGH: Primary | ICD-10-CM

## 2019-09-19 PROCEDURE — 99282 EMERGENCY DEPT VISIT SF MDM: CPT

## 2019-09-19 ASSESSMENT — ENCOUNTER SYMPTOMS: COUGH: 1

## 2019-09-19 NOTE — ED PROVIDER NOTES
[address] [facility] ED  EMERGENCY DEPARTMENT ENCOUNTER      Pt Name: Pamela Michaels  MRN: [id number]  [place] 1980  Date of evaluation: 9/19/2019  Provider: Jj Chavira PA-C      HISTORY OF PRESENT ILLNESS    Pamela Michaels is a 46 y.o. male who presents to the Emergency Department with cough for 1 week. Work sent him here. He says he has a cold he Is fine and wants to go back to work tmrw. Cough is nonproductive. He is a smoker. Denies cp or sob         REVIEW OF SYSTEMS       Review of Systems   Respiratory: Positive for cough. All other systems reviewed and are negative. PAST MEDICAL HISTORY     Past Medical History:   Diagnosis Date    Arrhythmia     Seizure [facility])          SURGICAL HISTORY     History reviewed. No pertinent surgical history. CURRENT MEDICATIONS       Previous Medications    No medications on file       ALLERGIES     Patient has no known allergies. FAMILY HISTORY     History reviewed. No pertinent family history.        SOCIAL HISTORY       Social History     Socioeconomic History    Marital status:      Spouse name: None    Number of children: None    Years of education: None    Highest education level: None   Occupational History    None   Social Needs    Financial resource strain: None    Food insecurity:     Worry: None     Inability: None    Transportation needs:     Medical: None     Non-medical: None   Tobacco Use    Smoking status: Current Every Day Smoker     Packs/day: 0.50     Types: Cigarettes    Smokeless tobacco: Never Used   Substance and Sexual Activity    Alcohol use: No    Drug use: No    Sexual activity: Yes     Partners: Female   Lifestyle    Physical activity:     Days per week: None     Minutes per session: None    Stress: None   Relationships    Social connections:     Talks on phone: None     Gets together: None     Attends Anglican service: None     Active member of club or organization: None     Attends meetings of clubs or organizations: None     Relationship status: None    Intimate partner violence:     Fear of current or ex partner: None     Emotionally abused: None     Physically abused: None     Forced sexual activity: None   Other Topics Concern    None   Social History Narrative    None       SCREENINGS             PHYSICAL EXAM    (up to 7 for level 4, 8 or more for level 5)     ED Triage Vitals [09/19/19 0902]   BP Temp Temp Source Pulse Resp SpO2 Height Weight   122/76 98 °F (36.7 °C) Oral 69 20 100 % 6' (1.829 m) 170 lb (77.1 kg)       Physical Exam   Constitutional: He is oriented to person, place, and time. He appears well-developed and well-nourished. He is active. No distress. HENT:   Head: Normocephalic and atraumatic. Mouth/Throat: Mucous membranes are normal.   Eyes: Conjunctivae and lids are normal.   Neck: Normal range of motion. Neck supple. Cardiovascular: Normal rate, regular rhythm, normal heart sounds, intact distal pulses and normal pulses. Pulmonary/Chest: Effort normal and breath sounds normal.   Occ. Dry cough   Abdominal: Soft. Normal appearance and bowel sounds are normal. There is no tenderness. Lymphadenopathy:     He has no cervical adenopathy. Neurological: He is alert and oriented to person, place, and time. He has normal strength. Skin: Skin is warm, dry and intact. Capillary refill takes less than 2 seconds. No rash noted. He is not diaphoretic. Psychiatric: Judgment and thought content normal.   Nursing note and vitals reviewed. All other labs were within normal range or not returned as of this dictation. EMERGENCY DEPARTMENT COURSE and DIFFERENTIALDIAGNOSIS/MDM:   Vitals:    Vitals:    09/19/19 0902   BP: 122/76   Pulse: 69   Resp: 20   Temp: 98 °F (36.7 °C)   TempSrc: Oral   SpO2: 100%   Weight: 170 lb (77.1 kg)   Height: 6' (1.829 m)            Pt is nontoxic nad. Vitals wnl. Pt wants to go back to work tmrw. He denies fevers.  He denies wanting any

## 2020-08-05 ENCOUNTER — OFFICE VISIT (OUTPATIENT)
Dept: FAMILY MEDICINE CLINIC | Age: 40
End: 2020-08-05

## 2020-08-05 ENCOUNTER — HOSPITAL ENCOUNTER (OUTPATIENT)
Dept: GENERAL RADIOLOGY | Age: 40
Discharge: HOME | End: 2020-08-07

## 2020-08-05 ENCOUNTER — HOSPITAL ENCOUNTER (EMERGENCY)
Age: 40
Discharge: HOME | End: 2020-08-05
Attending: EMERGENCY MEDICINE

## 2020-08-05 VITALS
OXYGEN SATURATION: 100 % | RESPIRATION RATE: 18 BRPM | BODY MASS INDEX: 24.41 KG/M2 | HEART RATE: 93 BPM | WEIGHT: 180 LBS | DIASTOLIC BLOOD PRESSURE: 74 MMHG | SYSTOLIC BLOOD PRESSURE: 121 MMHG | TEMPERATURE: 98.6 F

## 2020-08-05 VITALS
DIASTOLIC BLOOD PRESSURE: 72 MMHG | OXYGEN SATURATION: 97 % | HEIGHT: 72 IN | HEART RATE: 79 BPM | TEMPERATURE: 97.6 F | BODY MASS INDEX: 23.03 KG/M2 | WEIGHT: 170 LBS | SYSTOLIC BLOOD PRESSURE: 108 MMHG

## 2020-08-05 PROCEDURE — 99213 OFFICE O/P EST LOW 20 MIN: CPT | Performed by: NURSE PRACTITIONER

## 2020-08-05 PROCEDURE — 73630 X-RAY EXAM OF FOOT: CPT

## 2020-08-05 PROCEDURE — 99283 EMERGENCY DEPT VISIT LOW MDM: CPT

## 2020-08-05 RX ORDER — HYDROCODONE BITARTRATE AND ACETAMINOPHEN 5; 325 MG/1; MG/1
1 TABLET ORAL EVERY 6 HOURS PRN
Qty: 12 TABLET | Refills: 0 | Status: SHIPPED | OUTPATIENT
Start: 2020-08-05 | End: 2020-08-08

## 2020-08-05 ASSESSMENT — PAIN DESCRIPTION - LOCATION: LOCATION: FOOT

## 2020-08-05 ASSESSMENT — PAIN DESCRIPTION - ORIENTATION: ORIENTATION: RIGHT

## 2020-08-05 ASSESSMENT — PAIN SCALES - GENERAL: PAINLEVEL_OUTOF10: 10

## 2020-08-05 ASSESSMENT — PAIN DESCRIPTION - PAIN TYPE: TYPE: ACUTE PAIN

## 2020-08-05 ASSESSMENT — PAIN DESCRIPTION - DESCRIPTORS: DESCRIPTORS: ACHING;THROBBING

## 2020-08-05 ASSESSMENT — PAIN DESCRIPTION - FREQUENCY: FREQUENCY: CONTINUOUS

## 2020-08-05 NOTE — PROGRESS NOTES
Skin:     General: Skin is warm and dry. Neurological:      General: No focal deficit present. Mental Status: He is alert and oriented to person, place, and time. Assessment and Plan    Cristian was seen today for ankle pain. Diagnoses and all orders for this visit:    Right foot pain  -     XR FOOT RIGHT (MIN 3 VIEWS); Future  -     [address]      Return if symptoms worsen or fail to improve, for follow up with PCP. Will get xray and notify patient of the results. Ortho referral. Advised to RICE. Side effects and adverse effects of any medication prescribed today, as well as treatment plan/rationale, follow-up care, and result expectations have been discussed with the patient. Expresses understanding and desires to proceed with treatment plan. Discussed signs and symptoms which require immediate follow-up in ED/call to 911. Understanding verbalized. I have reviewed and updated the electronic medical record.     Daniela Ramon, APRN - CNP

## 2020-08-06 NOTE — ED NOTES
Pt presents to Ed with cc of right foot pain. Pt states that he was climbing down a ladder earlier today and missed the last step twisting his right ankle. Pt states that it began to get pretty painful so he went to urgent care center in [place].  They were unable to xray it there and he was sent directly to radiology here at [facility]. Pt then went home and called [facility] to get results. They then told him to come to [facility] [place]. Pt AOx4. Skin pink, warm and dry.      Tor Dickerson RN  08/05/20 6072

## 2020-08-06 NOTE — ED PROVIDER NOTES
Medical: None     Non-medical: None   Tobacco Use    Smoking status: Current Every Day Smoker     Packs/day: 0.50     Types: Cigarettes    Smokeless tobacco: Never Used   Substance and Sexual Activity    Alcohol use: No    Drug use: No    Sexual activity: Yes     Partners: Female   Lifestyle    Physical activity     Days per week: None     Minutes per session: None    Stress: None   Relationships    Social connections     Talks on phone: None     Gets together: None     Attends Druze service: None     Active member of club or organization: None     Attends meetings of clubs or organizations: None     Relationship status: None    Intimate partner violence     Fear of current or ex partner: None     Emotionally abused: None     Physically abused: None     Forced sexual activity: None   Other Topics Concern    None   Social History Narrative    None       SCREENINGS      @FLOW([id number])@      PHYSICAL EXAM    (up to 7 for level 4, 8 or more for level 5)     ED Triage Vitals   BP Temp Temp Source Pulse Resp SpO2 Height Weight   08/05/20 2145 08/05/20 2143 08/05/20 2143 08/05/20 2143 08/05/20 2143 08/05/20 2143 -- 08/05/20 2143   121/74 98.6 °F (37 °C) Oral 93 18 100 %  180 lb (81.6 kg)       Physical Exam  Vitals signs and nursing note reviewed. Constitutional:       Appearance: He is well-developed. HENT:      Head: Normocephalic. Right Ear: Tympanic membrane normal.      Left Ear: Tympanic membrane normal.      Nose: Nose normal.      Mouth/Throat:      Mouth: Mucous membranes are moist.   Eyes:      Conjunctiva/sclera: Conjunctivae normal.      Pupils: Pupils are equal, round, and reactive to light. Neck:      Musculoskeletal: Normal range of motion and neck supple. Cardiovascular:      Rate and Rhythm: Normal rate and regular rhythm. Heart sounds: Normal heart sounds. Pulmonary:      Effort: Pulmonary effort is normal.      Breath sounds: Normal breath sounds.    Abdominal: General: Bowel sounds are normal.      Palpations: Abdomen is soft. Tenderness: There is no abdominal tenderness. There is no guarding. Musculoskeletal: Normal range of motion. Skin:     General: Skin is warm and dry. Capillary Refill: Capillary refill takes less than 2 seconds. Neurological:      Mental Status: He is alert and oriented to person, place, and time. Psychiatric:         Mood and Affect: Mood normal.         DIAGNOSTIC RESULTS     EKG: All EKG's are interpreted by the Emergency Department Physician who either signs or Co-signsthis chart in the absence of a cardiologist.      RADIOLOGY:   Fithian Geralds such as CT, Ultrasound and MRI are read by the radiologist. Plain radiographic images are visualized and preliminarily interpreted by the emergency physician with the below findings:      Interpretation per the Radiologist below, if available at the time ofthis note:    No orders to display         ED BEDSIDE ULTRASOUND:   Performed by ED Physician - none    LABS:  Labs Reviewed - No data to display    All other labs were within normal range or not returned as of this dictation. EMERGENCY DEPARTMENT COURSE and DIFFERENTIAL DIAGNOSIS/MDM:   Vitals:    Vitals:    08/05/20 2143 08/05/20 2145   BP:  121/74   Pulse: 93    Resp: 18    Temp: 98.6 °F (37 °C)    TempSrc: Oral    SpO2: 100%    Weight: 180 lb (81.6 kg)           MDM patient's x-ray read from earlier today by the radiologist as negative. He is put in a stirrup brace and discharged home with limited pain medication. CONSULTS:  None    PROCEDURES:  Unless otherwise noted below, none     Procedures    FINAL IMPRESSION      1.  Sprain of right ankle, unspecified ligament, initial encounter          DISPOSITION/PLAN   DISPOSITION Decision To Discharge 08/05/2020 10:19:38 PM      PATIENT REFERRED TO:  Diego Eubanks MD  [address] [phone]    In 4 days        DISCHARGE MEDICATIONS:  New Prescriptions    HYDROCODONE-ACETAMINOPHEN (NORCO) 5-325 MG PER TABLET    Take 1 tablet by mouth every 6 hours as needed for Pain for up to 3 days.           (Please note that portions of this note were completed with a voice recognition program.  Efforts were made to edit the dictations but occasionally words are mis-transcribed.)    Randi Galvan MD (electronically signed)  Attending Emergency Physician          Randi Galvan MD  08/05/20 1690

## 2020-08-06 NOTE — ED TRIAGE NOTES
Pt presents from home with c/o R foot injury. Onset this morning. Pt reports twisting R foot when stepping down from ladder. Pt seen in er today for XR. Pt reports he was never given results. Pt a&o x4. Resp even. Skin p/w/d. Pt reports pain to lateral R foot, edema noted. Skin intact. Strong pedal pulse.